# Patient Record
Sex: FEMALE | Race: WHITE | NOT HISPANIC OR LATINO
[De-identification: names, ages, dates, MRNs, and addresses within clinical notes are randomized per-mention and may not be internally consistent; named-entity substitution may affect disease eponyms.]

---

## 2017-02-12 ENCOUNTER — TRANSCRIPTION ENCOUNTER (OUTPATIENT)
Age: 82
End: 2017-02-12

## 2018-09-25 ENCOUNTER — TRANSCRIPTION ENCOUNTER (OUTPATIENT)
Age: 83
End: 2018-09-25

## 2018-09-30 ENCOUNTER — TRANSCRIPTION ENCOUNTER (OUTPATIENT)
Age: 83
End: 2018-09-30

## 2018-12-22 ENCOUNTER — EMERGENCY (EMERGENCY)
Facility: HOSPITAL | Age: 83
LOS: 1 days | Discharge: HOME | End: 2018-12-22
Attending: EMERGENCY MEDICINE | Admitting: EMERGENCY MEDICINE
Payer: MEDICARE

## 2018-12-22 VITALS
SYSTOLIC BLOOD PRESSURE: 110 MMHG | HEART RATE: 65 BPM | OXYGEN SATURATION: 97 % | DIASTOLIC BLOOD PRESSURE: 70 MMHG | RESPIRATION RATE: 16 BRPM

## 2018-12-22 VITALS
SYSTOLIC BLOOD PRESSURE: 103 MMHG | RESPIRATION RATE: 18 BRPM | HEART RATE: 60 BPM | DIASTOLIC BLOOD PRESSURE: 67 MMHG | OXYGEN SATURATION: 96 % | TEMPERATURE: 98 F

## 2018-12-22 DIAGNOSIS — S02.2XXA FRACTURE OF NASAL BONES, INITIAL ENCOUNTER FOR CLOSED FRACTURE: ICD-10-CM
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
## 2018-12-22 DIAGNOSIS — S90.31XA CONTUSION OF RIGHT FOOT, INITIAL ENCOUNTER: ICD-10-CM

## 2018-12-22 DIAGNOSIS — S92.331A DISPLACED FRACTURE OF THIRD METATARSAL BONE, RIGHT FOOT, INITIAL ENCOUNTER FOR CLOSED FRACTURE: ICD-10-CM
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
## 2018-12-22 DIAGNOSIS — S92.351A DISPLACED FRACTURE OF FIFTH METATARSAL BONE, RIGHT FOOT, INITIAL ENCOUNTER FOR CLOSED FRACTURE: ICD-10-CM

## 2018-12-22 DIAGNOSIS — S99.921A UNSPECIFIED INJURY OF RIGHT FOOT, INITIAL ENCOUNTER: ICD-10-CM

## 2018-12-22 DIAGNOSIS — S92.341A DISPLACED FRACTURE OF FOURTH METATARSAL BONE, RIGHT FOOT, INITIAL ENCOUNTER FOR CLOSED FRACTURE: ICD-10-CM
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
## 2018-12-22 DIAGNOSIS — S09.90XA UNSPECIFIED INJURY OF HEAD, INITIAL ENCOUNTER: ICD-10-CM

## 2018-12-22 PROCEDURE — 99284 EMERGENCY DEPT VISIT MOD MDM: CPT | Mod: 25

## 2018-12-22 PROCEDURE — 70450 CT HEAD/BRAIN W/O DYE: CPT

## 2018-12-22 PROCEDURE — 93005 ELECTROCARDIOGRAM TRACING: CPT

## 2018-12-22 PROCEDURE — 93010 ELECTROCARDIOGRAM REPORT: CPT

## 2018-12-22 PROCEDURE — 73630 X-RAY EXAM OF FOOT: CPT

## 2018-12-22 PROCEDURE — 70450 CT HEAD/BRAIN W/O DYE: CPT | Mod: 26

## 2018-12-22 PROCEDURE — 73630 X-RAY EXAM OF FOOT: CPT | Mod: 26,RT

## 2018-12-22 NOTE — ED PROVIDER NOTE - MEDICAL DECISION MAKING DETAILS
metatarsal fx left foot. neurovascular intact. sent by urgent care for evaluation. repeat x-rays done and unable to open cd images. + 3rd  and 4th metatarsal fx. pt has cam walker and walker. f/u with podiatry. ct head done given fall. no bleed. + nasal bone fx. results d/w pt. f/u with pmd metatarsal fx left foot. neurovascular intact. sent by urgent care for evaluation. repeat x-rays done and unable to open cd images. + 3rd  and 4th metatarsal fx. pt has cam walker and walker. pt with steady gait with cam walker and walker in ED. pt evaluated by case mgt in ED. f/u with podiatry. ct head done given fall. no bleed. + nasal bone fx. results d/w pt. f/u with pmd

## 2018-12-22 NOTE — ED PROVIDER NOTE - ATTENDING CONTRIBUTION TO CARE
86 y/o female legally blind c/o right foot fx. pt states tripped and fell on sidewalk  a "few days ago". pt notes persistent pain to right foot. pt out running errands today and stopped at a [employer] and + fx on x-ray and sent to ED. pt admits to head injury with fall. no loc, ha, dizziness or weakness. no neck or back pain. no cp, sob, abd pain, n/v. pt able to bear wt. no further complaints.    PE - nasal bruising , right foot tenderness 4th and 5th metatarsal, no distal nv deficit, cap refill < 2 sec    Imaging shows right foot 4th and 5th metatarsal fx, ct with nasal fx - seen by podiatry, agree with cam walker    seen by  and pt ok with d/c - ok to manage at home

## 2018-12-22 NOTE — ED ADULT NURSE NOTE - OBJECTIVE STATEMENT
Patient present to ED for complains of unable to ambulate due to right foot pain, sustained a fall recently with unknown LOC, patient is legally blind. Denies taking blood thinner, no nausea  vomiting, and abdomen pain. Visible bruising on nose, patient arrived with cam boot over right LE.

## 2018-12-22 NOTE — ED PROVIDER NOTE - OBJECTIVE STATEMENT
84 y/o female legally blind c/o right foot fx. pt states tripped and fell on sidewalk  a "few days ago". pt notes persistent pain to right foot. pt out running errands today and stopped at a [employer] and + fx on x-ray and sent to ED. pt admits to head injury with fall. no loc, ha, dizziness or weakness. no neck or back pain. no cp, sob, abd pain, n/v. pt able to bear wt. no further complaints.

## 2018-12-22 NOTE — ED PROVIDER NOTE - CARE PROVIDER_API CALL
Alejandro Vogel (RL), Podiatric Medicine and Surgery  [address] [place] [address]  Phone: [phone]  Fax: [phone]

## 2018-12-22 NOTE — ED ADULT NURSE NOTE - NSIMPLEMENTINTERV_GEN_ALL_ED
Implemented All Fall with Harm Risk Interventions:  [place] to call system. Call bell, personal items and telephone within reach. Instruct patient to call for assistance. Room bathroom lighting operational. Non-slip footwear when patient is off stretcher. Physically safe environment: no spills, clutter or unnecessary equipment. Stretcher in lowest position, wheels locked, appropriate side rails in place. Provide visual cue, wrist band, yellow gown, etc. Monitor gait and stability. Monitor for mental status changes and reorient to person, place, and time. Review medications for side effects contributing to fall risk. Reinforce activity limits and safety measures with patient and family. Provide visual clues: red socks.

## 2018-12-22 NOTE — ED PROVIDER NOTE - PHYSICAL EXAMINATION
healing bruising to nasal bride. no deformity. nares patent b/l. no septal hematoma visible  + bruising and tenderness to dorsal distal left foot. FROM. no edema. distal NVI. no deformity. remainder of foot and ankle non tender.

## 2018-12-22 NOTE — ED PROVIDER NOTE - NSFOLLOWUPCLINICS_GEN_ALL_ED_FT
[facility]  Podiatry  [address] [place] [address]  Phone: [phone]  Fax:   Follow Up Time: 4-6 Days

## 2018-12-22 NOTE — ED ADULT TRIAGE NOTE - CHIEF COMPLAINT QUOTE
"I fell at home a few days ago and I went to an urgent care today. They told me that my right foot was broken."

## 2018-12-23 ENCOUNTER — TRANSCRIPTION ENCOUNTER (OUTPATIENT)
Age: 83
End: 2018-12-23

## 2019-10-31 ENCOUNTER — TRANSCRIPTION ENCOUNTER (OUTPATIENT)
Age: 84
End: 2019-10-31

## 2019-10-31 ENCOUNTER — APPOINTMENT (OUTPATIENT)
Dept: OTOLARYNGOLOGY | Facility: CLINIC | Age: 84
End: 2019-10-31
Payer: MEDICARE

## 2019-10-31 VITALS
DIASTOLIC BLOOD PRESSURE: 84 MMHG | OXYGEN SATURATION: 98 % | RESPIRATION RATE: 14 BRPM | HEART RATE: 85 BPM | WEIGHT: 135 LBS | SYSTOLIC BLOOD PRESSURE: 126 MMHG | BODY MASS INDEX: 21.19 KG/M2 | HEIGHT: 67 IN | TEMPERATURE: 97.8 F

## 2019-10-31 DIAGNOSIS — Z87.891 PERSONAL HISTORY OF NICOTINE DEPENDENCE: ICD-10-CM

## 2019-10-31 DIAGNOSIS — Z82.49 FAMILY HISTORY OF ISCHEMIC HEART DISEASE AND OTHER DISEASES OF THE CIRCULATORY SYSTEM: ICD-10-CM

## 2019-10-31 DIAGNOSIS — S09.91XA UNSPECIFIED INJURY OF EAR, INITIAL ENCOUNTER: ICD-10-CM

## 2019-10-31 DIAGNOSIS — Z86.19 PERSONAL HISTORY OF OTHER INFECTIOUS AND PARASITIC DISEASES: ICD-10-CM

## 2019-10-31 DIAGNOSIS — Z82.0 FAMILY HISTORY OF EPILEPSY AND OTHER DISEASES OF THE NERVOUS SYSTEM: ICD-10-CM

## 2019-10-31 DIAGNOSIS — H93.90 UNSPECIFIED DISORDER OF EAR, UNSPECIFIED EAR: ICD-10-CM

## 2019-10-31 PROBLEM — F32.9 MAJOR DEPRESSIVE DISORDER, SINGLE EPISODE, UNSPECIFIED: Chronic | Status: ACTIVE | Noted: 2018-12-22

## 2019-10-31 PROBLEM — H54.7 UNSPECIFIED VISUAL LOSS: Chronic | Status: ACTIVE | Noted: 2018-12-22

## 2019-10-31 PROBLEM — Z00.00 ENCOUNTER FOR PREVENTIVE HEALTH EXAMINATION: Status: ACTIVE | Noted: 2019-10-31

## 2019-10-31 PROCEDURE — 99204 OFFICE O/P NEW MOD 45 MIN: CPT

## 2019-10-31 RX ORDER — CIPROFLOXACIN HYDROCHLORIDE 500 MG/1
500 TABLET, FILM COATED ORAL TWICE DAILY
Qty: 14 | Refills: 0 | Status: ACTIVE | COMMUNITY
Start: 2019-10-31 | End: 1900-01-01

## 2019-10-31 RX ORDER — COLISTIN SULFATE, NEOMYCIN SULFATE, THONZONIUM BROMIDE AND HYDROCORTISONE ACETATE 3; 3.3; .5; 1 MG/ML; MG/ML; MG/ML; MG/ML
3.3-3-10-0.5 SUSPENSION AURICULAR (OTIC) 4 TIMES DAILY
Qty: 4 | Refills: 3 | Status: ACTIVE | COMMUNITY
Start: 2019-10-31 | End: 1900-01-01

## 2019-10-31 NOTE — PHYSICAL EXAM
[de-identified] : tm not visible, hearing down an slight bleeding lt [de-identified] :  Trauma to Left ear canal.  Ear debridement was performed.  [Normal] : mucosa is normal [Midline] : trachea located in midline position

## 2019-10-31 NOTE — REASON FOR VISIT
[Initial Evaluation] : an initial evaluation for [FreeTextEntry2] : Trauma to the left ear on 10/30/2019.  Patient states her level of severity is a level 1 out of 10 and it occurs constant.  Patient states nothing helps to improve or worsens her Trauma to the left ear on 10/30/2019.

## 2019-10-31 NOTE — REVIEW OF SYSTEMS
[Patient Intake Form Reviewed] : Patient intake form was reviewed [As Noted in HPI] : as noted in HPI [Ear Pain] : ear pain [Hearing Loss] : hearing loss [Eyesight Problems] : eyesight problems [Negative] : Heme/Lymph

## 2019-10-31 NOTE — PROCEDURE
[Other ___] : [unfilled] [] : Removal of Cerumen [FreeTextEntry5] : Trauma to Left ear canal.  Ear debridement.  Weeks suction # 5 and ear alligator

## 2019-10-31 NOTE — HISTORY OF PRESENT ILLNESS
[de-identified] : 86 years old female patient with history of Trauma to the left ear on 10/30/2019.  Patient is present today in the office with Trauma to Left ear canal.  Ear debridement was performed.  Patient states she walked into her home cabinet

## 2019-11-05 ENCOUNTER — TRANSCRIPTION ENCOUNTER (OUTPATIENT)
Age: 84
End: 2019-11-05

## 2019-11-11 ENCOUNTER — APPOINTMENT (OUTPATIENT)
Dept: OTOLARYNGOLOGY | Facility: CLINIC | Age: 84
End: 2019-11-11

## 2019-11-13 ENCOUNTER — APPOINTMENT (OUTPATIENT)
Dept: OTOLARYNGOLOGY | Facility: CLINIC | Age: 84
End: 2019-11-13

## 2019-11-25 ENCOUNTER — APPOINTMENT (OUTPATIENT)
Dept: OTOLARYNGOLOGY | Facility: CLINIC | Age: 84
End: 2019-11-25

## 2020-01-31 ENCOUNTER — TRANSCRIPTION ENCOUNTER (OUTPATIENT)
Age: 85
End: 2020-01-31

## 2020-02-14 ENCOUNTER — TRANSCRIPTION ENCOUNTER (OUTPATIENT)
Age: 85
End: 2020-02-14
